# Patient Record
Sex: FEMALE | Race: ASIAN | NOT HISPANIC OR LATINO
[De-identification: names, ages, dates, MRNs, and addresses within clinical notes are randomized per-mention and may not be internally consistent; named-entity substitution may affect disease eponyms.]

---

## 2024-09-29 PROBLEM — Z00.00 ENCOUNTER FOR PREVENTIVE HEALTH EXAMINATION: Status: ACTIVE | Noted: 2024-09-29

## 2024-09-30 ENCOUNTER — APPOINTMENT (OUTPATIENT)
Dept: CARDIOLOGY | Facility: CLINIC | Age: 79
End: 2024-09-30
Payer: MEDICARE

## 2024-09-30 VITALS
OXYGEN SATURATION: 99 % | HEIGHT: 64 IN | BODY MASS INDEX: 26.98 KG/M2 | SYSTOLIC BLOOD PRESSURE: 129 MMHG | HEART RATE: 88 BPM | DIASTOLIC BLOOD PRESSURE: 82 MMHG | WEIGHT: 158 LBS | RESPIRATION RATE: 16 BRPM

## 2024-09-30 DIAGNOSIS — R07.9 CHEST PAIN, UNSPECIFIED: ICD-10-CM

## 2024-09-30 PROCEDURE — 99204 OFFICE O/P NEW MOD 45 MIN: CPT

## 2024-09-30 PROCEDURE — G2211 COMPLEX E/M VISIT ADD ON: CPT

## 2024-09-30 NOTE — HISTORY OF PRESENT ILLNESS
[FreeTextEntry1] : 77 yo lady with PMHx of DM2, HTN, and HLD who presents as a new patient  09/30/24: ROS + new dyspnea on exertion and rare pinching sensation CP at rest and sometimes with exertion for last few months.   PMHx/PSHx as above FMHx no CV disease Social hx no substance use

## 2024-09-30 NOTE — DISCUSSION/SUMMARY
[FreeTextEntry1] : 79 yo lady with PMHx of DM2, HTN, and HLD who presents as a new patient  EKG sinus mary  Assessment: 1. Dyspnea on exertion 2. Possibly cardiac CP 3. HTN, HLD, DM2   Plan: 1. DM2 so intermediate risk for obs CAD + structural heart disease given above sx -> CT coronary with IV contrast + TTE 2. Mod intensity statin given DM2 3. W/o prior ASCVD and 10y ASCVD <10% so BP goal <140/90 -> losartan 50mg PO QD (ideal given DM2) 4. RTC 2mo w/ TTE   During non face-to-face time, I reviewed relevant portions of the patients medical record. During face-to-face time, I took a relevant history and examined the patient. I also explained differential diagnoses, relevant cardiac diagnoses, workup, and management plan, which required a moderate level of medical decision making. I answered all questions related to the patient's medical conditions.   Adalid HOLLEY (John)  of Cardiology [facility] at [facility] [place]/[facility]

## 2024-09-30 NOTE — HISTORY OF PRESENT ILLNESS
[FreeTextEntry1] : 79 yo lady with PMHx of DM2, HTN, and HLD who presents as a new patient  09/30/24: ROS + new dyspnea on exertion and rare pinching sensation CP at rest and sometimes with exertion for last few months.   PMHx/PSHx as above FMHx no CV disease Social hx no substance use

## 2024-09-30 NOTE — DISCUSSION/SUMMARY
[FreeTextEntry1] : 79 yo lady with PMHx of DM2, HTN, and HLD who presents as a new patient  EKG sinus mary  Assessment: 1. Dyspnea on exertion 2. Possibly cardiac CP 3. HTN, HLD, DM2   Plan: 1. DM2 so intermediate risk for obs CAD + structural heart disease given above sx -> CT coronary with IV contrast + TTE 2. Mod intensity statin given DM2 3. W/o prior ASCVD and 10y ASCVD <10% so BP goal <140/90 -> losartan 50mg PO QD (ideal given DM2) 4. RTC 2mo w/ TTE   During non face-to-face time, I reviewed relevant portions of the patients medical record. During face-to-face time, I took a relevant history and examined the patient. I also explained differential diagnoses, relevant cardiac diagnoses, workup, and management plan, which required a moderate level of medical decision making. I answered all questions related to the patient's medical conditions.   Adalid HOLLEY (John)  of Cardiology [facility] at \[facility]\""/[facility]

## 2024-09-30 NOTE — DISCUSSION/SUMMARY
[FreeTextEntry1] : 79 yo lady with PMHx of DM2, HTN, and HLD who presents as a new patient  EKG sinus mary  Assessment: 1. Dyspnea on exertion 2. Possibly cardiac CP 3. HTN, HLD, DM2   Plan: 1. DM2 so intermediate risk for obs CAD + structural heart disease given above sx -> CT coronary with IV contrast + TTE 2. Mod intensity statin given DM2 3. W/o prior ASCVD and 10y ASCVD <10% so BP goal <140/90 -> losartan 50mg PO QD (ideal given DM2) 4. RTC 2mo w/ TTE   During non face-to-face time, I reviewed relevant portions of the patients medical record. During face-to-face time, I took a relevant history and examined the patient. I also explained differential diagnoses, relevant cardiac diagnoses, workup, and management plan, which required a moderate level of medical decision making. I answered all questions related to the patient's medical conditions.   Adalid HOLLEY (John)  of Cardiology [facility] at [facility]

## 2024-12-05 ENCOUNTER — APPOINTMENT (OUTPATIENT)
Dept: CARDIOLOGY | Facility: CLINIC | Age: 79
End: 2024-12-05
Payer: MEDICARE

## 2024-12-05 VITALS — DIASTOLIC BLOOD PRESSURE: 75 MMHG | SYSTOLIC BLOOD PRESSURE: 125 MMHG

## 2024-12-05 PROCEDURE — 93306 TTE W/DOPPLER COMPLETE: CPT

## 2024-12-05 PROCEDURE — 99214 OFFICE O/P EST MOD 30 MIN: CPT

## 2024-12-05 PROCEDURE — G2211 COMPLEX E/M VISIT ADD ON: CPT

## 2025-06-05 ENCOUNTER — APPOINTMENT (OUTPATIENT)
Dept: CARDIOLOGY | Facility: CLINIC | Age: 80
End: 2025-06-05